# Patient Record
Sex: FEMALE | Employment: PART TIME | URBAN - METROPOLITAN AREA
[De-identification: names, ages, dates, MRNs, and addresses within clinical notes are randomized per-mention and may not be internally consistent; named-entity substitution may affect disease eponyms.]

---
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
## 2023-12-21 ENCOUNTER — HOSPITAL ENCOUNTER (EMERGENCY)
Facility: CLINIC | Age: 18
Discharge: HOME | End: 2023-12-21
Attending: EMERGENCY MEDICINE | Admitting: EMERGENCY MEDICINE
Payer: COMMERCIAL

## 2023-12-21 ENCOUNTER — APPOINTMENT (OUTPATIENT)
Dept: GENERAL RADIOLOGY | Facility: CLINIC | Age: 18
End: 2023-12-21
Attending: EMERGENCY MEDICINE
Payer: COMMERCIAL

## 2023-12-21 VITALS
DIASTOLIC BLOOD PRESSURE: 80 MMHG | RESPIRATION RATE: 17 BRPM | SYSTOLIC BLOOD PRESSURE: 122 MMHG | TEMPERATURE: 98.1 F | OXYGEN SATURATION: 100 % | HEART RATE: 88 BPM
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
## 2023-12-21 PROCEDURE — 71046 X-RAY EXAM CHEST 2 VIEWS: CPT

## 2023-12-21 PROCEDURE — 99283 EMERGENCY DEPT VISIT LOW MDM: CPT | Mod: 25 | Performed by: EMERGENCY MEDICINE

## 2023-12-21 PROCEDURE — 99283 EMERGENCY DEPT VISIT LOW MDM: CPT | Performed by: EMERGENCY MEDICINE

## 2023-12-21 PROCEDURE — 71046 X-RAY EXAM CHEST 2 VIEWS: CPT | Mod: 26 | Performed by: RADIOLOGY

## 2023-12-21 ASSESSMENT — ACTIVITIES OF DAILY LIVING (ADL)
ADLS_ACUITY_SCORE: 35
ADLS_ACUITY_SCORE: 35
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
## 2023-12-21 NOTE — DISCHARGE INSTRUCTIONS
1. No acute airspace disease.   2. Subtle anterior wedging involving midthoracic vertebral body,   presumably at the level of T7, which may represent age-indeterminate   compression fracture deformity. Recommend clinical correlation.

## 2024-04-13 ENCOUNTER — HEALTH MAINTENANCE LETTER (OUTPATIENT)
Age: 19
End: 2024-04-13

## 2025-04-19 ENCOUNTER — HEALTH MAINTENANCE LETTER (OUTPATIENT)
Age: 20
End: 2025-04-19

## 2025-05-06 ENCOUNTER — HOSPITAL ENCOUNTER (EMERGENCY)
Facility: CLINIC | Age: 20
Discharge: HOME | End: 2025-05-06
Attending: EMERGENCY MEDICINE | Admitting: EMERGENCY MEDICINE
Payer: COMMERCIAL

## 2025-05-06 VITALS
WEIGHT: 250 LBS | TEMPERATURE: 98 F | BODY MASS INDEX: 35.79 KG/M2 | SYSTOLIC BLOOD PRESSURE: 122 MMHG | DIASTOLIC BLOOD PRESSURE: 71 MMHG | HEART RATE: 89 BPM | HEIGHT: 70 IN | RESPIRATION RATE: 16 BRPM | OXYGEN SATURATION: 99 %

## 2025-05-06 DIAGNOSIS — S81.811A LACERATION OF RIGHT LOWER EXTREMITY, INITIAL ENCOUNTER: ICD-10-CM

## 2025-05-06 PROCEDURE — 99282 EMERGENCY DEPT VISIT SF MDM: CPT

## 2025-05-06 PROCEDURE — 12001 RPR S/N/AX/GEN/TRNK 2.5CM/<: CPT

## 2025-05-06 ASSESSMENT — COLUMBIA-SUICIDE SEVERITY RATING SCALE - C-SSRS
2. HAVE YOU ACTUALLY HAD ANY THOUGHTS OF KILLING YOURSELF IN THE PAST MONTH?: NO
6. HAVE YOU EVER DONE ANYTHING, STARTED TO DO ANYTHING, OR PREPARED TO DO ANYTHING TO END YOUR LIFE?: NO
1. IN THE PAST MONTH, HAVE YOU WISHED YOU WERE DEAD OR WISHED YOU COULD GO TO SLEEP AND NOT WAKE UP?: NO

## 2025-05-06 ASSESSMENT — ACTIVITIES OF DAILY LIVING (ADL)
ADLS_ACUITY_SCORE: 41
ADLS_ACUITY_SCORE: 41

## 2025-05-06 NOTE — ED PROVIDER NOTES
"  Emergency Department Note      History of Present Illness     Chief Complaint   Laceration      HPI   Violeta Crawley is a 19 year old female who presents to the ED for evaluation of  a laceration to the right lower leg.  The patient reports a ceramic bowl dropped and shattered.  A portion hit her leg.  She sustained a laceration.  Her mother put a paper towel dressing with milligrams over this.  Patient confirms her tetanus is up-to-date in the past 10 years.  patient denies any other injury.  She did not have any syncope.  Bleeding controlled.  No other injuries with this.  Patient confirms she has all of her immunizations through the [facility] services and she does confirm that her tetanus is up-to-date.    Independent Historian   Mother over phone in patient room provide some of the above history and confirmation.  The patient has no allergies to anesthetics.    Review of External Notes   MIIC reviewed and no immunization record on file.    Past Medical History     Medical History and Problem List   No past medical history on file.    Medications   No current outpatient medications on file.      Surgical History   No past surgical history on file.    Physical Exam     Patient Vitals for the past 24 hrs:   BP Temp Temp src Pulse Resp SpO2 Height Weight   05/06/25 1432 122/71 98  F (36.7  C) Temporal 89 16 99 % 1.778 m (5' 10\") 113.4 kg (250 lb)     Physical Exam  General:  Sitting on bed, comfortable appearing.   HENT:  No obvious trauma to head  Right Ear:  External ear normal.   Left Ear:  External ear normal.   Nose:  Nose normal.   Eyes:  Conjunctivae and EOM are normal.  Neck: Normal range of motion. Neck supple. No tracheal deviation present.   Pulm/Chest: No respiratory distress  M/S: Normal range of motion.   Neuro: Alert. GCS 15.  Skin: Skin is warm and dry. No rash noted. Not diaphoretic.  Right lateral lower leg above the malleoli there is a 1.8 cm laceration with a slight gap to " it.  Psych: Normal mood and affect. Behavior is normal.     Diagnostics     Lab Results   Labs Ordered and Resulted from Time of ED Arrival to Time of ED Departure - No data to display    Imaging   No orders to display         Independent Interpretation   None    ED Course      Medications Administered   Medications - No data to display    Procedure(s):  Procedures       Laceration Repair      Procedure: Laceration Repair    Indication: Laceration    Consent: Verbal    Tetanus status reviewed    Location: Right Lower extremity     Length: 1.8 cm    Preparation: Irrigation with Sterile Saline.    Anesthesia/Sedation: Lidocaine - 1%      Treatment/Exploration: Wound explored, no foreign bodies found     Closure: The wound was closed with one layer. Skin/superficial layer was closed with 3 x 4-0 Nylon using Interrupted sutures.     Patient Status: The patient tolerated the procedure well: Yes. There were no complications.    Discussion of Management   None    ED Course   ED Course as of 05/06/25 1638 Tue May 06, 2025   1638 Repair went well.  Wound care discussed.       Additional Documentation  None    Medical Decision Making / Diagnosis     MIPS   None    [facility]   Violeta Crawley is a very pleasant 19 year old female who presented to the ED for evaluation of the above complaints.  Exam and history are consistent with an uncomplicated laceration. The wound was carefully evaluated and explored. The laceration was repaired as noted above. There is no evidence of muscular, tendon, or bony damage with this laceration. There are no signs of foreign body. Possible complications including scarring and infection were reviewed along with appropriate return precautions including high fever, spreading redness, or purulent wound discharge. They will follow up per discharge instructions. Patient's tetanus was not seen in [facility], but patient reports is up-to-date.    The treatment plan was discussed with the patient and they  expressed understanding of this plan and consented to the plan.  In addition, the patient will return to the emergency department if their symptoms persist, worsen, if new symptoms arise or if there is any concern as other pathology may be present that is not evident at this time. They also understand the importance of close follow up in the clinic and if unable to do so will return to the emergency department for a reevaluation. All questions were answered.    Disposition   The patient was discharged.     Diagnosis     ICD-10-CM    1. Laceration of right lower extremity, initial encounter  S81.811A            Discharge Medications   New Prescriptions    No medications on file       DO Raj Hernandez, Jeferson Bellamy DO  05/06/25 1638

## 2025-05-06 NOTE — ED TRIAGE NOTES
Pt presents with laceration to right lateral/lower leg after dropping a ceramic bowl in kitchen. Bowl shattered on the floor, causing cut. Bleeding controlled with dressing.      Triage Assessment (Adult)       Row Name 05/06/25 1433          Triage Assessment    Airway WDL WDL        Respiratory WDL    Respiratory WDL WDL;all     Rhythm/Pattern, Respiratory unlabored;pattern regular;depth regular;no shortness of breath reported        Skin Circulation/Temperature WDL    Skin Circulation/Temperature WDL X  laceration to right lateral leg        Cardiac WDL    Cardiac WDL WDL        Peripheral/Neurovascular WDL    Peripheral Neurovascular WDL WDL        Cognitive/Neuro/Behavioral WDL    Cognitive/Neuro/Behavioral WDL WDL

## 2025-05-06 NOTE — DISCHARGE INSTRUCTIONS
Discharge Instructions  Laceration (Cut)    You were seen today for a laceration (cut).  Your provider examined your laceration for any problems such a buried foreign body (like glass, a splinter, or gravel), or injury to blood vessels, tendons, and nerves.  Your provider may have also rinsed and/or scrubbed your laceration to help prevent an infection. It may not be possible to find all problems with your laceration on the first visit; occasionally foreign bodies or a tendon injury can go undetected.    Your laceration may have been closed in one of several ways:  No closure: many wounds will heal just fine without closure.  Stitches: regular stitches that require removal.  Staples: skin staples are often used in the scalp/head.  Wound adhesive (glue): skin glue can be used for certain lacerations and doesn t require removal.  Wound strips (aka Butterfly bandages or steri-strips): these are bandages that help to close a wound.  Absorbable stitches:  dissolving  stitches that go away on their own and usually don t require removal.    A small percentage of wounds will develop an infection regardless of how well the wound is cared for. Antibiotics are generally not indicated to prevent an infection so are only given for a small number of high-risk wounds. Some lacerations are too high risk to close, and are left open to heal because closure can increase the likelihood that an infection will develop.    Remember that all lacerations, no matter how expertly repaired, will cause scarring. We consider many factors, techniques, and materials, in our efforts to provide the best possible cosmetic outcome.    Generally, every Emergency Department visit should have a follow-up clinic visit with either a primary or a specialty clinic/provider. Please follow-up as instructed by your emergency provider today.     Return to the Emergency Department right away if:  You have more redness, swelling, pain, drainage (pus), a bad smell,  or red streaking from your laceration as these symptoms could indicate an infection.  You have a fever of 100.4 F or more.  You have bleeding that you cannot stop at home. If your cut starts to bleed, hold pressure on the bleeding area with a clean cloth or put pressure over the bandage.  If the bleeding does not stop after using constant pressure for 30 minutes, you should return to the Emergency Department for further treatment.  An area past the laceration is cool, pale, or blue compared with the other side, or has a slower return of color when squeezed.  Your dressing seems too tight or starts to get uncomfortable or painful. For children, signs of a problem might be irritability or restlessness.  You have loss of normal function or use of an area, such as being unable to straighten or bend a finger normally.  You have a numb area past the laceration.    Return to the Emergency Department or see your regular provider if:  The laceration starts to come open.   You have something coming out of the cut or a feeling that there is something in the laceration.  Your wound will not heal, or keeps breaking open. There can always be glass, wood, dirt or other things in any wound.  They will not always show up, even on x-rays.  If a wound does not heal, this may be why, and it is important to follow-up with your regular provider.    Home Care:  Take your dressing off in 12-24 hours, or as instructed by your provider, to check your laceration. Remove the dressing sooner if it seems too tight or painful, or if it is getting numb, tingly, or pale past the dressing.  Gently wash your laceration 1-2 times daily with clean water and mild soap. It is okay to shower or run clean water over the laceration, but do not let the laceration soak in water (no swimming).  If your laceration was closed with wound adhesive or strips: pat it dry and leave it open to the air. For all other repairs: after you wash your laceration, or at least  2 times a day, apply antibiotic ointment (such as Neosporin  or Bacitracin ) to the laceration, then cover it with a Band-Aid  or gauze.  Keep the laceration clean. Wear gloves or other protective clothing if you are around dirt.    Follow-up for removal:  If your wound was closed with staples or regular stitches, they need to be removed according to the instructions and timeline specified by your provider today.  If your wound was closed with absorbable ( dissolving ) sutures, they should fall out, dissolve, or not be visible in about one week. If they are still visible, then they should be removed according to the instructions and timeline specified by your provider today.    Scars:  To help minimize scarring:  Wear sunscreen over the healed laceration when out in the sun.  Massage the area regularly once healed.  You may apply Vitamin E to the healed wound.  Wait. Scars improve in appearance over months and years.    If you were given a prescription for medicine here today, be sure to read all of the information (including the package insert) that comes with your prescription.  This will include important information about the medicine, its side effects, and any warnings that you need to know about.  The pharmacist who fills the prescription can provide more information and answer questions you may have about the medicine.  If you have questions or concerns that the pharmacist cannot address, please call or return to the Emergency Department.       Remember that you can always come back to the Emergency Department if you are not able to see your regular provider in the amount of time listed above, if you get any new symptoms, or if there is anything that worries you.